# Patient Record
(demographics unavailable — no encounter records)

---

## 2024-12-11 NOTE — DISCUSSION/SUMMARY
[FreeTextEntry1] : 17 year old female presents to clinic for stomach ache. 1. Stomach ache - Based on HPI and physical exam likely patient is having indigestion related to contaminated or spoiled food. -Discussed with patient to eat smaller meals, eat slowly; avoid fast food, fried food, and fatty foods. -Encouraged a bland diet until symptoms resolve. Encouraged patient to increase fluid intake and drink ginger-celia to alleviate indigestion. Avoid life stressors. -Provided patient with Kaopectate for indigestion/GI upset relief.  Pt will RTC for new or worsening symptoms.

## 2024-12-11 NOTE — PHYSICAL EXAM
[Alert] : alert [NL] : soft, nontender, nondistended, normal bowel sounds, no hepatosplenomegaly [Acute Distress] : no acute distress [Tired appearing] : not tired appearing [Lethargic] : not lethargic [Toxic] : not toxic [Stridor] : no stridor

## 2024-12-11 NOTE — HISTORY OF PRESENT ILLNESS
[FreeTextEntry6] : 17 year old female presents to clinic for LUQ pain Reports pain when she breathes in She reports it started about 1 hour ago Pain is 5/10 Last ate this morning at 7am: Taco with chicken, lettuce, and sauce Has been eating [employer] throughout the day

## 2025-05-29 NOTE — DISCUSSION/SUMMARY
[FreeTextEntry1] : 17 year old female presents to clinic for irregular menses, vaginal itch urine pregnancy test negative BV and GC/CT screen done - will notify of abnormal results -Encouraged patient to limit number of sex partners and to consistently use condoms for all sex encounters to prevent STI/HIV and pregnancy. Dispensed condom supply today. Discussed all birth control options, no decisions made today. recommended student stop shaving until itching subsides and don't use previous soap. only clean vagina with water.  Return to clinic as needed

## 2025-05-29 NOTE — PHYSICAL EXAM
[NL] : no acute distress, alert [Luis M: ____] : Luis M [unfilled] [Normal external genitalia] : normal external genitalia [Vaginal discharge] : vaginal discharge [Erythema surrounding anus] : no erythema surrounding anus [FreeTextEntry6] : + vaginal odor, thin yellowish discharge, white thick discharge noted outside labia majora

## 2025-05-29 NOTE — REVIEW OF SYSTEMS
[Vaginal Itch] : vaginal itch [Irregular Menstrual Cycle] : irregular menstrual cycle [Negative] : Constitutional

## 2025-05-29 NOTE — HISTORY OF PRESENT ILLNESS
[FreeTextEntry6] : 17 year old female presents to clinic for pregnancy test states her menses are 1 week late  she endorses having unprotected sex frequently LMP ~4/26 last sexual encounter last week without condoms last sexual encounter without condoms prior to most recent: 2 weeks ago student rarely uses condoms boyfriend x 6 months: 18 years old  also c/o vaginal itching x few days thinks it is due to new soap she is using - started using a few days ago but has since stopped. states itching improving slightly shaves vaginal area - last shaved a few days ago but does shave frequently ~ every week or every other week

## 2025-06-03 NOTE — HISTORY OF PRESENT ILLNESS
[FreeTextEntry6] : 17 year old female called down to clinic for BV results and treatment results positive for BV and candida infections c/o vaginal itch, discharge and odor last sexual encounter 2 weeks ago

## 2025-06-03 NOTE — DISCUSSION/SUMMARY
[FreeTextEntry1] : 17 year old female presents to clinic for BV and yeast infection treatment BD Affirm positive for Gardnerella vaginalis.  Metronidazole 500 mg 1 tab po BID x 7 days dispensed.  1st dose taken in clinic Medication information provided.  Counseled on abstinence from alcohol during course of treatment and for three days after completion of treatment. Counseled regarding possible side effects of antibiotic: urine may be discolored. Counseled regarding preventative measures - encouraged consistent condom use, abstaining from use of feminine hygiene products, scented sanitary products, detergents, and soaps, wearing cotton-lined underwear, wiping from front to back. Abstain from sex until symptoms resolve.   BD Affirm positive for candida.  Diflucan 150 mg po x 2 dispensed.  1st dose taken in clinic, will take 2nd pill in 3 days Medication information provided.   Return to clinic as needed